# Patient Record
Sex: MALE | Race: WHITE | Employment: FULL TIME | URBAN - METROPOLITAN AREA
[De-identification: names, ages, dates, MRNs, and addresses within clinical notes are randomized per-mention and may not be internally consistent; named-entity substitution may affect disease eponyms.]

---

## 2020-06-29 ENCOUNTER — HOSPITAL ENCOUNTER (OUTPATIENT)
Age: 65
Setting detail: OBSERVATION
Discharge: HOME | End: 2020-06-30
Attending: EMERGENCY MEDICINE | Admitting: HOSPITALIST
Payer: COMMERCIAL

## 2020-06-29 ENCOUNTER — APPOINTMENT (OUTPATIENT)
Dept: CT IMAGING | Age: 65
End: 2020-06-29
Attending: EMERGENCY MEDICINE
Payer: COMMERCIAL

## 2020-06-29 DIAGNOSIS — R10.84 ABDOMINAL PAIN, GENERALIZED: ICD-10-CM

## 2020-06-29 DIAGNOSIS — R11.2 NAUSEA AND VOMITING, INTRACTABILITY OF VOMITING NOT SPECIFIED, UNSPECIFIED VOMITING TYPE: ICD-10-CM

## 2020-06-29 DIAGNOSIS — E87.20 LACTIC ACIDOSIS: ICD-10-CM

## 2020-06-29 DIAGNOSIS — I95.89 OTHER SPECIFIED HYPOTENSION: ICD-10-CM

## 2020-06-29 DIAGNOSIS — R00.1 JUNCTIONAL BRADYCARDIA: Primary | ICD-10-CM

## 2020-06-29 PROBLEM — N17.9 AKI (ACUTE KIDNEY INJURY) (HCC): Status: ACTIVE | Noted: 2020-06-29

## 2020-06-29 PROBLEM — I95.9 HYPOTENSION: Status: ACTIVE | Noted: 2020-06-29

## 2020-06-29 LAB
ALBUMIN SERPL-MCNC: 3.4 G/DL (ref 3.5–5)
ALBUMIN SERPL-MCNC: 3.7 G/DL (ref 3.5–5)
ALBUMIN/GLOB SERPL: 1 {RATIO} (ref 1.1–2.2)
ALBUMIN/GLOB SERPL: 1 {RATIO} (ref 1.1–2.2)
ALP SERPL-CCNC: 101 U/L (ref 45–117)
ALP SERPL-CCNC: 98 U/L (ref 45–117)
ALT SERPL-CCNC: 103 U/L (ref 12–78)
ALT SERPL-CCNC: 124 U/L (ref 12–78)
ANION GAP BLD CALC-SCNC: 15 MMOL/L (ref 10–20)
ANION GAP SERPL CALC-SCNC: 10 MMOL/L (ref 5–15)
ANION GAP SERPL CALC-SCNC: 7 MMOL/L (ref 5–15)
APPEARANCE UR: CLEAR
AST SERPL-CCNC: 81 U/L (ref 15–37)
AST SERPL-CCNC: 88 U/L (ref 15–37)
ATRIAL RATE: 43 BPM
ATRIAL RATE: 64 BPM
BACTERIA URNS QL MICRO: NEGATIVE /HPF
BASOPHILS # BLD: 0 K/UL (ref 0–0.1)
BASOPHILS NFR BLD: 0 % (ref 0–1)
BILIRUB SERPL-MCNC: 0.5 MG/DL (ref 0.2–1)
BILIRUB SERPL-MCNC: 0.7 MG/DL (ref 0.2–1)
BILIRUB UR QL: NEGATIVE
BUN BLD-MCNC: 19 MG/DL (ref 9–20)
BUN SERPL-MCNC: 12 MG/DL (ref 6–20)
BUN SERPL-MCNC: 19 MG/DL (ref 6–20)
BUN/CREAT SERPL: 14 (ref 12–20)
BUN/CREAT SERPL: 15 (ref 12–20)
CA-I BLD-MCNC: 1.16 MMOL/L (ref 1.12–1.32)
CALCIUM SERPL-MCNC: 8.4 MG/DL (ref 8.5–10.1)
CALCIUM SERPL-MCNC: 8.9 MG/DL (ref 8.5–10.1)
CALCULATED P AXIS, ECG09: 58 DEGREES
CALCULATED R AXIS, ECG10: -31 DEGREES
CALCULATED R AXIS, ECG10: -34 DEGREES
CALCULATED T AXIS, ECG11: -15 DEGREES
CALCULATED T AXIS, ECG11: -32 DEGREES
CHLORIDE BLD-SCNC: 107 MMOL/L (ref 98–107)
CHLORIDE SERPL-SCNC: 106 MMOL/L (ref 97–108)
CHLORIDE SERPL-SCNC: 110 MMOL/L (ref 97–108)
CK MB CFR SERPL CALC: 1.3 % (ref 0–2.5)
CK MB SERPL-MCNC: 2.5 NG/ML (ref 5–25)
CK SERPL-CCNC: 186 U/L (ref 39–308)
CK SERPL-CCNC: 232 U/L (ref 39–308)
CO2 BLD-SCNC: 23 MMOL/L (ref 21–32)
CO2 SERPL-SCNC: 21 MMOL/L (ref 21–32)
CO2 SERPL-SCNC: 24 MMOL/L (ref 21–32)
COLOR UR: NORMAL
COMMENT, HOLDF: NORMAL
CREAT BLD-MCNC: 1 MG/DL (ref 0.6–1.3)
CREAT SERPL-MCNC: 0.88 MG/DL (ref 0.7–1.3)
CREAT SERPL-MCNC: 1.31 MG/DL (ref 0.7–1.3)
DIAGNOSIS, 93000: NORMAL
DIAGNOSIS, 93000: NORMAL
DIFFERENTIAL METHOD BLD: ABNORMAL
EOSINOPHIL # BLD: 0.1 K/UL (ref 0–0.4)
EOSINOPHIL NFR BLD: 1 % (ref 0–7)
EPITH CASTS URNS QL MICRO: NORMAL /LPF
ERYTHROCYTE [DISTWIDTH] IN BLOOD BY AUTOMATED COUNT: 13.7 % (ref 11.5–14.5)
GLOBULIN SER CALC-MCNC: 3.5 G/DL (ref 2–4)
GLOBULIN SER CALC-MCNC: 3.6 G/DL (ref 2–4)
GLUCOSE BLD STRIP.AUTO-MCNC: 129 MG/DL (ref 65–100)
GLUCOSE BLD-MCNC: 133 MG/DL (ref 65–100)
GLUCOSE SERPL-MCNC: 126 MG/DL (ref 65–100)
GLUCOSE SERPL-MCNC: 91 MG/DL (ref 65–100)
GLUCOSE UR STRIP.AUTO-MCNC: NEGATIVE MG/DL
HCT VFR BLD AUTO: 43 % (ref 36.6–50.3)
HCT VFR BLD CALC: 43 % (ref 36.6–50.3)
HGB BLD-MCNC: 14.6 G/DL (ref 12.1–17)
HGB UR QL STRIP: NEGATIVE
HYALINE CASTS URNS QL MICRO: NORMAL /LPF (ref 0–5)
IMM GRANULOCYTES # BLD AUTO: 0.1 K/UL (ref 0–0.04)
IMM GRANULOCYTES NFR BLD AUTO: 1 % (ref 0–0.5)
INR PPP: 1 (ref 0.9–1.1)
KETONES UR QL STRIP.AUTO: NEGATIVE MG/DL
LACTATE BLD-SCNC: 2.4 MMOL/L (ref 0.4–2)
LACTATE BLD-SCNC: 3.2 MMOL/L (ref 0.4–2)
LACTATE SERPL-SCNC: 1.6 MMOL/L (ref 0.4–2)
LEUKOCYTE ESTERASE UR QL STRIP.AUTO: NEGATIVE
LIPASE SERPL-CCNC: 96 U/L (ref 73–393)
LYMPHOCYTES # BLD: 0.7 K/UL (ref 0.8–3.5)
LYMPHOCYTES NFR BLD: 7 % (ref 12–49)
MCH RBC QN AUTO: 29.9 PG (ref 26–34)
MCHC RBC AUTO-ENTMCNC: 34 G/DL (ref 30–36.5)
MCV RBC AUTO: 87.9 FL (ref 80–99)
MONOCYTES # BLD: 0.9 K/UL (ref 0–1)
MONOCYTES NFR BLD: 9 % (ref 5–13)
NEUTS SEG # BLD: 8.2 K/UL (ref 1.8–8)
NEUTS SEG NFR BLD: 82 % (ref 32–75)
NITRITE UR QL STRIP.AUTO: NEGATIVE
NRBC # BLD: 0 K/UL (ref 0–0.01)
NRBC BLD-RTO: 0 PER 100 WBC
P-R INTERVAL, ECG05: 146 MS
PH UR STRIP: 6 [PH] (ref 5–8)
PLATELET # BLD AUTO: 173 K/UL (ref 150–400)
PLATELET COMMENTS,PCOM: ABNORMAL
PMV BLD AUTO: 9.5 FL (ref 8.9–12.9)
POTASSIUM BLD-SCNC: 3.5 MMOL/L (ref 3.5–5.1)
POTASSIUM SERPL-SCNC: 3.7 MMOL/L (ref 3.5–5.1)
POTASSIUM SERPL-SCNC: 4 MMOL/L (ref 3.5–5.1)
PROT SERPL-MCNC: 6.9 G/DL (ref 6.4–8.2)
PROT SERPL-MCNC: 7.3 G/DL (ref 6.4–8.2)
PROT UR STRIP-MCNC: NEGATIVE MG/DL
PROTHROMBIN TIME: 10.9 SEC (ref 9–11.1)
Q-T INTERVAL, ECG07: 420 MS
Q-T INTERVAL, ECG07: 458 MS
QRS DURATION, ECG06: 106 MS
QRS DURATION, ECG06: 98 MS
QTC CALCULATION (BEZET), ECG08: 354 MS
QTC CALCULATION (BEZET), ECG08: 433 MS
RBC # BLD AUTO: 4.89 M/UL (ref 4.1–5.7)
RBC #/AREA URNS HPF: NORMAL /HPF (ref 0–5)
RBC MORPH BLD: ABNORMAL
SAMPLES BEING HELD,HOLD: NORMAL
SARS-COV-2, COV2: NOT DETECTED
SERVICE CMNT-IMP: ABNORMAL
SERVICE CMNT-IMP: ABNORMAL
SODIUM BLD-SCNC: 139 MMOL/L (ref 136–145)
SODIUM SERPL-SCNC: 137 MMOL/L (ref 136–145)
SODIUM SERPL-SCNC: 141 MMOL/L (ref 136–145)
SOURCE, COVRS: NORMAL
SP GR UR REFRACTOMETRY: 1.01 (ref 1–1.03)
SPECIMEN SOURCE, FCOV2M: NORMAL
TROPONIN I BLD-MCNC: <0.04 NG/ML (ref 0–0.08)
TROPONIN I SERPL-MCNC: <0.05 NG/ML
TSH SERPL DL<=0.05 MIU/L-ACNC: 2.44 UIU/ML (ref 0.36–3.74)
UA: UC IF INDICATED,UAUC: NORMAL
UROBILINOGEN UR QL STRIP.AUTO: 1 EU/DL (ref 0.2–1)
VENTRICULAR RATE, ECG03: 36 BPM
VENTRICULAR RATE, ECG03: 64 BPM
WBC # BLD AUTO: 10 K/UL (ref 4.1–11.1)
WBC URNS QL MICRO: NORMAL /HPF (ref 0–4)

## 2020-06-29 PROCEDURE — 74011250636 HC RX REV CODE- 250/636: Performed by: INTERNAL MEDICINE

## 2020-06-29 PROCEDURE — 74011250637 HC RX REV CODE- 250/637: Performed by: HOSPITALIST

## 2020-06-29 PROCEDURE — 96361 HYDRATE IV INFUSION ADD-ON: CPT

## 2020-06-29 PROCEDURE — 85025 COMPLETE CBC W/AUTO DIFF WBC: CPT

## 2020-06-29 PROCEDURE — 80047 BASIC METABLC PNL IONIZED CA: CPT

## 2020-06-29 PROCEDURE — 93005 ELECTROCARDIOGRAM TRACING: CPT

## 2020-06-29 PROCEDURE — 83690 ASSAY OF LIPASE: CPT

## 2020-06-29 PROCEDURE — 84484 ASSAY OF TROPONIN QUANT: CPT

## 2020-06-29 PROCEDURE — 84443 ASSAY THYROID STIM HORMONE: CPT

## 2020-06-29 PROCEDURE — 99285 EMERGENCY DEPT VISIT HI MDM: CPT

## 2020-06-29 PROCEDURE — 80053 COMPREHEN METABOLIC PANEL: CPT

## 2020-06-29 PROCEDURE — 81001 URINALYSIS AUTO W/SCOPE: CPT

## 2020-06-29 PROCEDURE — 74011250636 HC RX REV CODE- 250/636: Performed by: HOSPITALIST

## 2020-06-29 PROCEDURE — 87635 SARS-COV-2 COVID-19 AMP PRB: CPT

## 2020-06-29 PROCEDURE — 85610 PROTHROMBIN TIME: CPT

## 2020-06-29 PROCEDURE — 36415 COLL VENOUS BLD VENIPUNCTURE: CPT

## 2020-06-29 PROCEDURE — 99218 HC RM OBSERVATION: CPT

## 2020-06-29 PROCEDURE — 71275 CT ANGIOGRAPHY CHEST: CPT

## 2020-06-29 PROCEDURE — 96374 THER/PROPH/DIAG INJ IV PUSH: CPT

## 2020-06-29 PROCEDURE — 94762 N-INVAS EAR/PLS OXIMTRY CONT: CPT

## 2020-06-29 PROCEDURE — 94761 N-INVAS EAR/PLS OXIMETRY MLT: CPT

## 2020-06-29 PROCEDURE — 83605 ASSAY OF LACTIC ACID: CPT

## 2020-06-29 PROCEDURE — 74011636320 HC RX REV CODE- 636/320: Performed by: EMERGENCY MEDICINE

## 2020-06-29 PROCEDURE — 74011250636 HC RX REV CODE- 250/636

## 2020-06-29 PROCEDURE — 96372 THER/PROPH/DIAG INJ SC/IM: CPT

## 2020-06-29 PROCEDURE — 82962 GLUCOSE BLOOD TEST: CPT

## 2020-06-29 PROCEDURE — 82550 ASSAY OF CK (CPK): CPT

## 2020-06-29 PROCEDURE — 74174 CTA ABD&PLVS W/CONTRAST: CPT

## 2020-06-29 PROCEDURE — 82553 CREATINE MB FRACTION: CPT

## 2020-06-29 PROCEDURE — 74011250636 HC RX REV CODE- 250/636: Performed by: EMERGENCY MEDICINE

## 2020-06-29 RX ORDER — DM/PE/ACETAMINOPHEN/CHLORPHENR 10-5-325-2
1 TABLET, SEQUENTIAL ORAL DAILY
COMMUNITY

## 2020-06-29 RX ORDER — LOSARTAN POTASSIUM 50 MG/1
50 TABLET ORAL DAILY
Status: DISCONTINUED | OUTPATIENT
Start: 2020-06-29 | End: 2020-06-30

## 2020-06-29 RX ORDER — SODIUM CHLORIDE 0.9 % (FLUSH) 0.9 %
5-40 SYRINGE (ML) INJECTION AS NEEDED
Status: DISCONTINUED | OUTPATIENT
Start: 2020-06-29 | End: 2020-06-30 | Stop reason: HOSPADM

## 2020-06-29 RX ORDER — ACETAMINOPHEN 325 MG/1
650 TABLET ORAL
Status: DISCONTINUED | OUTPATIENT
Start: 2020-06-29 | End: 2020-06-30 | Stop reason: HOSPADM

## 2020-06-29 RX ORDER — ATROPINE SULFATE 0.1 MG/ML
INJECTION INTRAVENOUS
Status: COMPLETED
Start: 2020-06-29 | End: 2020-06-29

## 2020-06-29 RX ORDER — LOSARTAN POTASSIUM 50 MG/1
50 TABLET ORAL DAILY
COMMUNITY
Start: 2020-03-26

## 2020-06-29 RX ORDER — SODIUM CHLORIDE 0.9 % (FLUSH) 0.9 %
10 SYRINGE (ML) INJECTION
Status: COMPLETED | OUTPATIENT
Start: 2020-06-29 | End: 2020-06-29

## 2020-06-29 RX ORDER — ATROPINE SULFATE 0.1 MG/ML
0.5 INJECTION INTRAVENOUS
Status: COMPLETED | OUTPATIENT
Start: 2020-06-29 | End: 2020-06-29

## 2020-06-29 RX ORDER — SODIUM CHLORIDE 9 MG/ML
100 INJECTION, SOLUTION INTRAVENOUS CONTINUOUS
Status: DISCONTINUED | OUTPATIENT
Start: 2020-06-29 | End: 2020-06-30

## 2020-06-29 RX ORDER — ESCITALOPRAM OXALATE 10 MG/1
10 TABLET ORAL DAILY
COMMUNITY
Start: 2020-03-26

## 2020-06-29 RX ORDER — SODIUM CHLORIDE 0.9 % (FLUSH) 0.9 %
5-40 SYRINGE (ML) INJECTION EVERY 8 HOURS
Status: DISCONTINUED | OUTPATIENT
Start: 2020-06-29 | End: 2020-06-30 | Stop reason: HOSPADM

## 2020-06-29 RX ORDER — ONDANSETRON 2 MG/ML
4 INJECTION INTRAMUSCULAR; INTRAVENOUS
Status: DISCONTINUED | OUTPATIENT
Start: 2020-06-29 | End: 2020-06-30 | Stop reason: HOSPADM

## 2020-06-29 RX ORDER — ENOXAPARIN SODIUM 100 MG/ML
40 INJECTION SUBCUTANEOUS EVERY 24 HOURS
Status: DISCONTINUED | OUTPATIENT
Start: 2020-06-29 | End: 2020-06-30 | Stop reason: HOSPADM

## 2020-06-29 RX ORDER — DOCUSATE SODIUM 100 MG/1
100 CAPSULE, LIQUID FILLED ORAL 2 TIMES DAILY
Status: DISCONTINUED | OUTPATIENT
Start: 2020-06-29 | End: 2020-06-30 | Stop reason: HOSPADM

## 2020-06-29 RX ADMIN — SODIUM CHLORIDE 1000 ML: 900 INJECTION, SOLUTION INTRAVENOUS at 05:36

## 2020-06-29 RX ADMIN — SODIUM CHLORIDE 1000 ML: 900 INJECTION, SOLUTION INTRAVENOUS at 02:03

## 2020-06-29 RX ADMIN — ATROPINE SULFATE 0.5 MG: 0.1 INJECTION INTRAVENOUS at 02:03

## 2020-06-29 RX ADMIN — ATROPINE SULFATE 0.5 MG: 0.1 INJECTION PARENTERAL at 02:03

## 2020-06-29 RX ADMIN — Medication 10 ML: at 02:03

## 2020-06-29 RX ADMIN — LOSARTAN POTASSIUM 50 MG: 50 TABLET ORAL at 12:51

## 2020-06-29 RX ADMIN — DOCUSATE SODIUM 100 MG: 100 CAPSULE, LIQUID FILLED ORAL at 11:34

## 2020-06-29 RX ADMIN — SODIUM CHLORIDE 100 ML/HR: 900 INJECTION, SOLUTION INTRAVENOUS at 11:25

## 2020-06-29 RX ADMIN — IOPAMIDOL 100 ML: 755 INJECTION, SOLUTION INTRAVENOUS at 02:03

## 2020-06-29 RX ADMIN — DOCUSATE SODIUM 100 MG: 100 CAPSULE, LIQUID FILLED ORAL at 17:17

## 2020-06-29 RX ADMIN — Medication 10 ML: at 17:18

## 2020-06-29 RX ADMIN — Medication 10 ML: at 22:54

## 2020-06-29 RX ADMIN — ENOXAPARIN SODIUM 40 MG: 40 INJECTION SUBCUTANEOUS at 11:34

## 2020-06-29 NOTE — PROGRESS NOTES
Pharmacy Clarification of Prior to Admission Medication Regimen     The patient was was interviewed regarding clarification of the prior to admission medication regimen. Patient present in room and obtained permission from patient to discuss drug regimen with visitor(s) present. Patient was questioned regarding use of any other inhalers, topical products, over the counter medications, herbal medications, vitamin products or ophthalmic/nasal/otic medication use. Information Obtained From: patient, rx query    Pertinent Pharmacy Findings:  Updated patients preferred outpatient pharmacy to: [facility]  Patient stated he used CBD oil under his tongue evening of 6/28/20    PTA medication list was corrected to the following:     Prior to Admission Medications   Prescriptions Last Dose Informant Taking?   aspirin delayed-release 81 mg tablet 6/28/2020 at 0800 Self Yes   Sig: Take 81 mg by mouth daily. escitalopram oxalate (LEXAPRO) 10 mg tablet 6/28/2020 at 0800 Self Yes   Sig: Take 10 mg by mouth daily. Take 1/2 tablet by mouth every day   losartan (COZAAR) 50 mg tablet 6/28/2020 at 0800 Self Yes   Sig: Take 50 mg by mouth daily.  Take 1 tablet by mouth every day      Facility-Administered Medications: None          Thank you,  [place] CPhT  Medication History

## 2020-06-29 NOTE — ED NOTES
TRANSFER - OUT REPORT:    Verbal report given to Al,RN(name) on Sam Delcid  being transferred to PCU ([place] for COVID r/o, Telemetry)(unit) for routine progression of care      Report consisted of patients Situation, Background, Assessment and   Recommendations(SBAR). Information from the following report(s) SBAR, Kardex, ED Summary, Intake/Output, MAR, Recent Results and Cardiac Rhythm SB was reviewed with the receiving nurse. Pt going to PCU as COVID r/o, Telemetry status. Discussed with charge RN. Lines:   Peripheral IV 06/29/20 Left Antecubital (Active)       Peripheral IV 06/29/20 Right Hand (Active)   Site Assessment Clean, dry, & intact 6/29/2020  2:00 AM   Phlebitis Assessment 0 6/29/2020  2:00 AM   Infiltration Assessment 0 6/29/2020  2:00 AM   Dressing Status Clean, dry, & intact 6/29/2020  2:00 AM   Dressing Type Transparent 6/29/2020  2:00 AM   Hub Color/Line Status Patent; Flushed;Pink 6/29/2020  2:00 AM        Opportunity for questions and clarification was provided.       Patient transported with:   Transport

## 2020-06-29 NOTE — H&P
Hospitalist Admission Note    NAME: Efren Hodgson   :  1955   MRN:  [id number]     Date/Time:  2020 9:56 AM    Patient PCP: Lisy Mazariegos MD   GI:  Dr. Dar Perera  ______________________________________________________________________   Assessment & Plan:  Junctional bradycardia HR 34-36 with hypotension in setting of severe abdominal pain, POA    Lactic acidosis 3.2-->2.4 with 2L IVF  --bradycardia improved with atropine and hypotension resolved with atropine and IVF  --per patient, baseline HR in 46s; stopped taking bystolic due to severe bradycardia HR in 40s  --observation status  --CTA chest/abdomen without aneurysm or mesenteric ischemia/stenosis  --cardiology consult. --check echo, tsh, repeat cardiac enzymes and lactic. Check lipase  --hold losartan as BP in low 100-110s currently  --check for covid-19 infection with rapid SARS-COV-2 test  --IVF NS 150ml/hr  --no clear infection, ?vasovagal response    Fatty liver  Chronic elevated LFTs per patient  --monitor LFTs. No further abdominal pain so hold off abd US at this time  --check INR    Mild DIEGO Cr 1.3, baseline unknown  --IVF and recheck, hold losartan for now. UA normal    Body mass index is 28.59 kg/m². Code: full  DVT prophylaxis: lovenox  Surrogate decision maker:  Wife Ninoska Giang [phone]        Subjective:   CHIEF COMPLAINT:  Abdominal pain    HISTORY OF PRESENT ILLNESS:     Efren Hodgson is a 63 y.o. male with PMH fatty liver, HTN, hyperlipidemia not on statin, hx elevated LFTs presented with acute onset of severe 10/10 diffuse abdominal pain, associated with nausea, and then had 3 episodes of vomiting yellow fluid in ER. [place] like his abdomen was going to explode. + sweats and dizziness. Last BM 3 days ago. Denied fever, chills, SOB. No chest pain. Had spent 5 hours cutting yard earlier yesterday and though he may be dehydrated and back was also achy.     In ER, was in junctional bradycardia HR 34, BP initially 80/62. Given atropine 0.5mg and 2L IVF. We were asked to admit for work up and evaluation of the above problems. Past Medical History:   Diagnosis Date    Fatty liver     Hyperlipidemia     Hypertension     Other ill-defined conditions(799.89)     MRSA L posterior thigh - 2006      Past Surgical History:   Procedure Laterality Date    HX HERNIA REPAIR Left 2012    HX TONSILLECTOMY      TN COLONOSCOPY FLX DX W/COLLJ SPEC WHEN PFRMD  11/21/2012    Dr. Crystal Langford History     Tobacco Use    Smoking status: Current Every Day Smoker     Types: Cigars    Smokeless tobacco: Never Used   Substance Use Topics    Alcohol use: Not Currently      Drug use:  denies  , work as     Family History   Problem Relation Age of Onset    Diabetes Mother     Alzheimer Father     Other Brother         fatty liver     Allergies   Allergen Reactions    Lisinopril Cough    Pravastatin Other (comments)     Right shoulder pain        Prior to Admission medications    Medication Sig Start Date End Date Taking? Authorizing Provider   escitalopram oxalate (LEXAPRO) 10 mg tablet Take 10 mg by mouth daily. Take 1/2 tablet by mouth every day 3/26/20  Yes Other, MD Marce   losartan (COZAAR) 50 mg tablet Take 50 mg by mouth daily. Take 1 tablet by mouth every day 3/26/20  Yes Other, MD Marce   glucosamine (Glucosamine Relief) 1,000 mg tab Take 1 Tab by mouth daily. Yes Provider, Historical   aspirin delayed-release 81 mg tablet Take 81 mg by mouth daily. Yes Provider, Historical     REVIEW OF SYSTEMS:  POSITIVE= Bold.   Negative = normal text  General:  fever, chills, sweats, generalized weakness, weight loss/gain, loss of appetite  Eyes:  blurred vision, eye pain, loss of vision, diplopia  Ear Nose and Throat:  rhinorrhea, pharyngitis  Respiratory:   cough, sputum production, SOB, wheezing, BASILIO, pleuritic pain  Cardiology:  chest pain, palpitations, orthopnea, PND, edema, syncope   Gastrointestinal:  abdominal pain, N/V, dysphagia, diarrhea, constipation, bleeding  Genitourinary:  frequency, urgency, dysuria, hematuria, incontinence  Muskuloskeletal :  arthralgia, myalgia  Hematology:  easy bruising, bleeding, lymphadenopathy  Dermatological:  rash, ulceration, pruritis  Endocrine:  hot flashes or polydipsia  Neurological:  headache, dizziness, confusion, focal weakness, paresthesia, memory loss, gait disturbance  Psychological: anxiety, depression, agitation      Objective:   VITALS:    Visit Vitals  /76   Pulse (!) 55   Temp 97.5 °F (36.4 °C)   Resp 20   Ht 5' 11\" (1.803 m)   Wt 93 kg (205 lb)   SpO2 98%   BMI 28.59 kg/m²     Temp (24hrs), Av.5 °F (36.4 °C), Min:97.5 °F (36.4 °C), Max:97.5 °F (36.4 °C)    Body mass index is 28.59 kg/m². PHYSICAL EXAM:    General:    Alert, cooperative, no distress, appears stated age. HEENT: Atraumatic, anicteric sclerae, pink conjunctivae     No oral ulcers, mucosa moist, throat clear. Hearing intact. Neck:  Supple, symmetrical,  thyroid: non tender  Lungs:   Clear to auscultation bilaterally. No Wheezing or Rhonchi. No rales. Chest wall:  No tenderness  No Accessory muscle use. Heart:   Regular  rhythm,  Bradycardia, HR mid 50s on tele. No  murmur   No gallop. No edema. Abdomen:   Soft, non-tender. Not distended. Bowel sounds normal. No masses  Extremities: No cyanosis. No clubbing  Skin:     Not pale Not Jaundiced  Several small abrasions on lower legs b/l  Psych:  Good insight. Not depressed. Not anxious or agitated. Neurologic: EOMs intact. No facial asymmetry. No aphasia or slurred speech. Symmetrical strength, Alert and oriented X 3.    Peripheral pulse: Right, Radial, 2+  Capillary refill:  normal    IMAGING RESULTS:   []       I have personally reviewed the actual   []     CXR  []     CT scan  CXR:  CT :  EKG:    ________________________________________________________________________  Care Plan discussed with:    Comments   Patient y    [facility]:      ________________________________________________________________________  Prophylaxis:  GI none   DVT lovenox   ________________________________________________________________________  Recommended Disposition:   Home with Family y   HH/PT/OT/RN    SNF/LTC    [facility]    ________________________________________________________________________  Code Status:  Full Code y   DNR/DNI    ________________________________________________________________________  TOTAL TIME: 55 minutes    ______________________________________________________________________  Ben Mullen MD      Procedures: see electronic medical records for all procedures/Xrays and details which were not copied into this note but were reviewed prior to creation of Plan.     LAB DATA REVIEWED:    Recent Results (from the past 24 hour(s))   EKG, 12 LEAD, INITIAL    Collection Time: 06/29/20  1:46 AM   Result Value Ref Range    Ventricular Rate 36 BPM    Atrial Rate 43 BPM    QRS Duration 106 ms    Q-T Interval 458 ms    QTC Calculation (Bezet) 354 ms    Calculated R Axis -34 degrees    Calculated T Axis -15 degrees    Diagnosis       Junctional bradycardia  Left axis deviation  Incomplete left bundle branch block  Minimal voltage criteria for LVH, may be normal variant  No previous ECGs available     GLUCOSE, POC    Collection Time: 06/29/20  1:59 AM   Result Value Ref Range    Glucose (POC) 129 (H) 65 - 100 mg/dL    Performed by Puja ZELAYA    METABOLIC PANEL, COMPREHENSIVE    Collection Time: 06/29/20  2:01 AM   Result Value Ref Range    Sodium 137 136 - 145 mmol/L    Potassium 3.7 3.5 - 5.1 mmol/L    Chloride 106 97 - 108 mmol/L    CO2 21 21 - 32 mmol/L    Anion gap 10 5 - 15 mmol/L    Glucose 126 (H) 65 - 100 mg/dL    BUN 19 6 - 20 MG/DL    Creatinine 1.31 (H) 0.70 - 1.30 MG/DL    BUN/Creatinine ratio 15 12 - 20      GFR est AA >60 >60 ml/min/1.73m2 GFR est non-AA 55 (L) >60 ml/min/1.73m2    Calcium 8.9 8.5 - 10.1 MG/DL    Bilirubin, total 0.5 0.2 - 1.0 MG/DL    ALT (SGPT) 103 (H) 12 - 78 U/L    AST (SGOT) 88 (H) 15 - 37 U/L    Alk. phosphatase 101 45 - 117 U/L    Protein, total 7.3 6.4 - 8.2 g/dL    Albumin 3.7 3.5 - 5.0 g/dL    Globulin 3.6 2.0 - 4.0 g/dL    A-G Ratio 1.0 (L) 1.1 - 2.2     CK W/ REFLX CKMB    Collection Time: 06/29/20  2:01 AM   Result Value Ref Range     39 - 308 U/L   TROPONIN I    Collection Time: 06/29/20  2:01 AM   Result Value Ref Range    Troponin-I, Qt. <0.05 <0.05 ng/mL   SAMPLES BEING HELD    Collection Time: 06/29/20  2:02 AM   Result Value Ref Range    SAMPLES BEING HELD BL     COMMENT        Add-on orders for these samples will be processed based on acceptable specimen integrity and analyte stability, which may vary by analyte. POC CHEM8    Collection Time: 06/29/20  2:02 AM   Result Value Ref Range    Calcium, ionized (POC) 1.16 1.12 - 1.32 mmol/L    Sodium (POC) 139 136 - 145 mmol/L    Potassium (POC) 3.5 3.5 - 5.1 mmol/L    Chloride (POC) 107 98 - 107 mmol/L    CO2 (POC) 23 21 - 32 mmol/L    Anion gap (POC) 15 10 - 20 mmol/L    Glucose (POC) 133 (H) 65 - 100 mg/dL    BUN (POC) 19 9 - 20 mg/dL    Creatinine (POC) 1.0 0.6 - 1.3 mg/dL    GFRAA, POC >60 >60 ml/min/1.73m2    GFRNA, POC >60 >60 ml/min/1.73m2    Hematocrit (POC) 43 36.6 - 50.3 %    Comment Comment Not Indicated.      POC TROPONIN-I    Collection Time: 06/29/20  2:03 AM   Result Value Ref Range    Troponin-I (POC) <0.04 0.00 - 0.08 ng/mL   POC LACTIC ACID    Collection Time: 06/29/20  2:07 AM   Result Value Ref Range    Lactic Acid (POC) 3.20 (HH) 0.40 - 2.00 mmol/L   EKG, 12 LEAD, SUBSEQUENT    Collection Time: 06/29/20  2:54 AM   Result Value Ref Range    Ventricular Rate 64 BPM    Atrial Rate 64 BPM    P-R Interval 146 ms    QRS Duration 98 ms    Q-T Interval 420 ms    QTC Calculation (Bezet) 433 ms    Calculated P Axis 58 degrees    Calculated R Axis -31 degrees    Calculated T Axis -32 degrees    Diagnosis       Normal sinus rhythm  Left axis deviation  Minimal voltage criteria for LVH, may be normal variant  When compared with ECG of 29-JUN-2020 01:46,  MANUAL COMPARISON REQUIRED, DATA IS UNCONFIRMED     URINALYSIS W/ REFLEX CULTURE    Collection Time: 06/29/20  3:21 AM   Result Value Ref Range    Color YELLOW/STRAW      Appearance CLEAR CLEAR      Specific gravity 1.015 1.003 - 1.030      pH (UA) 6.0 5.0 - 8.0      Protein Negative NEG mg/dL    Glucose Negative NEG mg/dL    Ketone Negative NEG mg/dL    Bilirubin Negative NEG      Blood Negative NEG      Urobilinogen 1.0 0.2 - 1.0 EU/dL    Nitrites Negative NEG      Leukocyte Esterase Negative NEG      UA:UC IF INDICATED CULTURE NOT INDICATED BY UA RESULT CNI      WBC 0-4 0 - 4 /hpf    RBC 0-5 0 - 5 /hpf    Epithelial cells FEW FEW /lpf    Bacteria Negative NEG /hpf    Hyaline cast 0-2 0 - 5 /lpf   CBC WITH AUTOMATED DIFF    Collection Time: 06/29/20  3:52 AM   Result Value Ref Range    WBC 10.0 4.1 - 11.1 K/uL    RBC 4.89 4. 10 - 5.70 M/uL    HGB 14.6 12.1 - 17.0 g/dL    HCT 43.0 36.6 - 50.3 %    MCV 87.9 80.0 - 99.0 FL    MCH 29.9 26.0 - 34.0 PG    MCHC 34.0 30.0 - 36.5 g/dL    RDW 13.7 11.5 - 14.5 %    PLATELET 138 120 - 580 K/uL    MPV 9.5 8.9 - 12.9 FL    NRBC 0.0 0  WBC    ABSOLUTE NRBC 0.00 0.00 - 0.01 K/uL    NEUTROPHILS 82 (H) 32 - 75 %    LYMPHOCYTES 7 (L) 12 - 49 %    MONOCYTES 9 5 - 13 %    EOSINOPHILS 1 0 - 7 %    BASOPHILS 0 0 - 1 %    IMMATURE GRANULOCYTES 1 (H) 0.0 - 0.5 %    ABS. NEUTROPHILS 8.2 (H) 1.8 - 8.0 K/UL    ABS. LYMPHOCYTES 0.7 (L) 0.8 - 3.5 K/UL    ABS. MONOCYTES 0.9 0.0 - 1.0 K/UL    ABS. EOSINOPHILS 0.1 0.0 - 0.4 K/UL    ABS. BASOPHILS 0.0 0.0 - 0.1 K/UL    ABS. IMM.  GRANS. 0.1 (H) 0.00 - 0.04 K/UL    DF SMEAR SCANNED      PLATELET COMMENTS Large Platelets      RBC COMMENTS NORMOCYTIC, NORMOCHROMIC     POC LACTIC ACID    Collection Time: 06/29/20  5:12 AM   Result Value Ref Range    Lactic Acid (POC) 2.40 (HH) 0.40 - 2.00 mmol/L   TROPONIN I    Collection Time: 06/29/20  5:38 AM   Result Value Ref Range    Troponin-I, Qt. <0.05 <0.05 ng/mL

## 2020-06-29 NOTE — ED NOTES
Pt care assumed. Pt arrived to the ED AAOX4, with a c/c of abd pain, being admitted for Bradycardia and Hypotension. Pt is now in ED room with side rail up, bed to lowest position and call light within reach. Pt verbalizes no other complaint at this time,VS noted stable. Will continue to monitor.

## 2020-06-29 NOTE — ED PROVIDER NOTES
EMERGENCY DEPARTMENT HISTORY AND PHYSICAL EXAM      Date: 6/29/2020  Patient Name: Elliot Johnson    History of Presenting Illness     Chief Complaint   Patient presents with    Abdominal Pain       History Provided By: Patient    HPI: Elliot Johnson, 63 y.o. male with PMHx significant for hypertension presents to the ED with chief complaint of severe abdominal pain that started about 10 PM.  Patient reports he \"feels like his abdomen is going to explode\". Patient denies any vomiting but does report some nausea tonight. He denies any black or bloody stools, constipation, diarrhea, dysuria, hematuria, urinary frequency. Gerldine Prost He denies any chest pain, shortness of breath, cough, or fever. Patient states his abdomen has become more painful through the evening. He wonders if he may have some heat exhaustion from working out in the heat yesterday. He denies history of coronary artery disease or irregular heart rhythm. PCP: Rehana Benson MD    No current facility-administered medications on file prior to encounter. Current Outpatient Medications on File Prior to Encounter   Medication Sig Dispense Refill    escitalopram oxalate (LEXAPRO) 10 mg tablet Take 10 mg by mouth daily. Take 1/2 tablet by mouth every day      losartan (COZAAR) 50 mg tablet Take 50 mg by mouth daily. Take 1 tablet by mouth every day      omega-3 fatty acids-vitamin e (FISH OIL) 1,000 mg cap Take 3 Caps by mouth daily.  aspirin delayed-release 81 mg tablet Take 81 mg by mouth daily. Past History     Past Medical History:  Past Medical History:   Diagnosis Date    Hypertension     Other ill-defined conditions(799.89)     MRSA L posterior thigh - 2006       Past Surgical History:  Past Surgical History:   Procedure Laterality Date    HX TONSILLECTOMY      [place] COLONOSCOPY FLX DX W/COLLJ SPEC WHEN PFRMD  11/21/2012            Family History:  History reviewed. No pertinent family history.     Social History:  Social History     Tobacco Use    Smoking status: Current Every Day Smoker    Smokeless tobacco: Never Used   Substance Use Topics    Alcohol use: Not Currently    Drug use: Never       Allergies:  No Known Allergies      Review of Systems   Review of Systems   Constitutional: Negative for chills and fever. HENT: Negative for congestion, ear pain and sore throat. Eyes: Negative. Respiratory: Negative for cough, chest tightness, shortness of breath and wheezing. Cardiovascular: Negative for chest pain and palpitations. Gastrointestinal: Positive for abdominal pain and nausea. Negative for diarrhea. All other systems reviewed and are negative.         Physical Exam   General appearance -morbidly obese, ill-appearing, anxious and diaphoretic  Eyes - pupils equal and reactive, extraocular eye movements intact  ENT - mucous membranes moist, pharynx normal without lesions  Neck - supple, no significant adenopathy; non-tender to palpation  Chest - clear to auscultation, no wheezes, rales or rhonchi; non-tender to palpation  Heart -extremely bradycardic (to low 30s) , no murmurs noted  Abdomen - soft, generalized abdominal tenderness, no rebound or guarding, nondistended, no masses or organomegaly  Musculoskeletal - no joint tenderness, deformity or swelling; normal ROM  Extremities - peripheral pulses normal, no pedal edema  Skin -pale, diaphoretic, clammy, no rashes  Neurological -anxious, alert, oriented x3, normal speech, no focal findings or movement disorder noted    Diagnostic Study Results     Labs -     Recent Results (from the past 12 hour(s))   EKG, 12 LEAD, INITIAL    Collection Time: 06/29/20  1:46 AM   Result Value Ref Range    Ventricular Rate 36 BPM    Atrial Rate 43 BPM    QRS Duration 106 ms    Q-T Interval 458 ms    QTC Calculation (Bezet) 354 ms    Calculated R Axis -34 degrees    Calculated T Axis -15 degrees    Diagnosis       Junctional bradycardia  Left axis deviation  Incomplete left bundle branch block  Minimal voltage criteria for LVH, may be normal variant  No previous ECGs available     GLUCOSE, POC    Collection Time: 06/29/20  1:59 AM   Result Value Ref Range    Glucose (POC) 129 (H) 65 - 100 mg/dL    Performed by Keli ZELAYA    METABOLIC PANEL, COMPREHENSIVE    Collection Time: 06/29/20  2:01 AM   Result Value Ref Range    Sodium 137 136 - 145 mmol/L    Potassium 3.7 3.5 - 5.1 mmol/L    Chloride 106 97 - 108 mmol/L    CO2 21 21 - 32 mmol/L    Anion gap 10 5 - 15 mmol/L    Glucose 126 (H) 65 - 100 mg/dL    BUN 19 6 - 20 MG/DL    Creatinine 1.31 (H) 0.70 - 1.30 MG/DL    BUN/Creatinine ratio 15 12 - 20      GFR est AA >60 >60 ml/min/1.73m2    GFR est non-AA 55 (L) >60 ml/min/1.73m2    Calcium 8.9 8.5 - 10.1 MG/DL    Bilirubin, total 0.5 0.2 - 1.0 MG/DL    ALT (SGPT) 103 (H) 12 - 78 U/L    AST (SGOT) 88 (H) 15 - 37 U/L    Alk. phosphatase 101 45 - 117 U/L    Protein, total 7.3 6.4 - 8.2 g/dL    Albumin 3.7 3.5 - 5.0 g/dL    Globulin 3.6 2.0 - 4.0 g/dL    A-G Ratio 1.0 (L) 1.1 - 2.2     CK W/ REFLX CKMB    Collection Time: 06/29/20  2:01 AM   Result Value Ref Range     39 - 308 U/L   TROPONIN I    Collection Time: 06/29/20  2:01 AM   Result Value Ref Range    Troponin-I, Qt. <0.05 <0.05 ng/mL   SAMPLES BEING HELD    Collection Time: 06/29/20  2:02 AM   Result Value Ref Range    SAMPLES BEING HELD BL     COMMENT        Add-on orders for these samples will be processed based on acceptable specimen integrity and analyte stability, which may vary by analyte.    POC CHEM8    Collection Time: 06/29/20  2:02 AM   Result Value Ref Range    Calcium, ionized (POC) 1.16 1.12 - 1.32 mmol/L    Sodium (POC) 139 136 - 145 mmol/L    Potassium (POC) 3.5 3.5 - 5.1 mmol/L    Chloride (POC) 107 98 - 107 mmol/L    CO2 (POC) 23 21 - 32 mmol/L    Anion gap (POC) 15 10 - 20 mmol/L    Glucose (POC) 133 (H) 65 - 100 mg/dL    BUN (POC) 19 9 - 20 mg/dL    Creatinine (POC) 1.0 0.6 - 1.3 mg/dL    GFRAA, POC >60 >60 ml/min/1.73m2    GFRNA, POC >60 >60 ml/min/1.73m2    Hematocrit (POC) 43 36.6 - 50.3 %    Comment Comment Not Indicated. POC TROPONIN-I    Collection Time: 06/29/20  2:03 AM   Result Value Ref Range    Troponin-I (POC) <0.04 0.00 - 0.08 ng/mL   POC LACTIC ACID    Collection Time: 06/29/20  2:07 AM   Result Value Ref Range    Lactic Acid (POC) 3.20 (HH) 0.40 - 2.00 mmol/L   EKG, 12 LEAD, SUBSEQUENT    Collection Time: 06/29/20  2:54 AM   Result Value Ref Range    Ventricular Rate 64 BPM    Atrial Rate 64 BPM    P-R Interval 146 ms    QRS Duration 98 ms    Q-T Interval 420 ms    QTC Calculation (Bezet) 433 ms    Calculated P Axis 58 degrees    Calculated R Axis -31 degrees    Calculated T Axis -32 degrees    Diagnosis       Normal sinus rhythm  Left axis deviation  Minimal voltage criteria for LVH, may be normal variant  When compared with ECG of 29-JUN-2020 01:46,  MANUAL COMPARISON REQUIRED, DATA IS UNCONFIRMED     URINALYSIS W/ REFLEX CULTURE    Collection Time: 06/29/20  3:21 AM   Result Value Ref Range    Color YELLOW/STRAW      Appearance CLEAR CLEAR      Specific gravity 1.015 1.003 - 1.030      pH (UA) 6.0 5.0 - 8.0      Protein Negative NEG mg/dL    Glucose Negative NEG mg/dL    Ketone Negative NEG mg/dL    Bilirubin Negative NEG      Blood Negative NEG      Urobilinogen 1.0 0.2 - 1.0 EU/dL    Nitrites Negative NEG      Leukocyte Esterase Negative NEG      UA:UC IF INDICATED CULTURE NOT INDICATED BY UA RESULT CNI      WBC 0-4 0 - 4 /hpf    RBC 0-5 0 - 5 /hpf    Epithelial cells FEW FEW /lpf    Bacteria Negative NEG /hpf    Hyaline cast 0-2 0 - 5 /lpf   CBC WITH AUTOMATED DIFF    Collection Time: 06/29/20  3:52 AM   Result Value Ref Range    WBC 10.0 4.1 - 11.1 K/uL    RBC 4.89 4. 10 - 5.70 M/uL    HGB 14.6 12.1 - 17.0 g/dL    HCT 43.0 36.6 - 50.3 %    MCV 87.9 80.0 - 99.0 FL    MCH 29.9 26.0 - 34.0 PG    MCHC 34.0 30.0 - 36.5 g/dL    RDW 13.7 11.5 - 14.5 %    PLATELET 336 088 - 400 K/uL    MPV 9.5 8.9 - 12.9 FL    NRBC 0.0 0  WBC    ABSOLUTE NRBC 0.00 0.00 - 0.01 K/uL    NEUTROPHILS 82 (H) 32 - 75 %    LYMPHOCYTES 7 (L) 12 - 49 %    MONOCYTES 9 5 - 13 %    EOSINOPHILS 1 0 - 7 %    BASOPHILS 0 0 - 1 %    IMMATURE GRANULOCYTES 1 (H) 0.0 - 0.5 %    ABS. NEUTROPHILS 8.2 (H) 1.8 - 8.0 K/UL    ABS. LYMPHOCYTES 0.7 (L) 0.8 - 3.5 K/UL    ABS. MONOCYTES 0.9 0.0 - 1.0 K/UL    ABS. EOSINOPHILS 0.1 0.0 - 0.4 K/UL    ABS. BASOPHILS 0.0 0.0 - 0.1 K/UL    ABS. IMM. GRANS. 0.1 (H) 0.00 - 0.04 K/UL    DF SMEAR SCANNED      PLATELET COMMENTS Large Platelets      RBC COMMENTS NORMOCYTIC, NORMOCHROMIC     POC LACTIC ACID    Collection Time: 06/29/20  5:12 AM   Result Value Ref Range    Lactic Acid (POC) 2.40 (HH) 0.40 - 2.00 mmol/L       Radiologic Studies -   CTA CHEST W OR W WO CONT   Final Result   IMPRESSION:       1. Normal appearance to the thoracic, abdominal, and pelvic vasculature with no   aneurysm or dissection. 2. Incidental findings as above including nonobstructing left nephrolithiasis. CTA ABDOMEN PELV W CONT   Final Result   IMPRESSION:       1. Normal appearance to the thoracic, abdominal, and pelvic vasculature with no   aneurysm or dissection. 2. Incidental findings as above including nonobstructing left nephrolithiasis. CT Results  (Last 48 hours)               06/29/20 0226  CTA CHEST W OR W WO CONT Final result    Impression:  IMPRESSION:        1. Normal appearance to the thoracic, abdominal, and pelvic vasculature with no   aneurysm or dissection. 2. Incidental findings as above including nonobstructing left nephrolithiasis. Narrative:  EXAM: CTA CHEST, ABDOMEN, AND PELVIS       CLINICAL HISTORY: hypotensive abd pain, eval aorta. COMPARISON: None. TECHNIQUE: CTA of the chest, abdomen, and pelvis performed with helical 2.5 mm   axial reconstructions. Sagittal and coronal 2.5 mm reformatted imaging was   performed.  Thin section and thick section MIP sagittal and coronal   reconstructions and manual post-processing of the images with 3-D volume   rotating MIP projections of the vascular tree generated. Standard dose   modulation was utilized to reduce the overall radiation dose administered to the   patient. CONTRAST:  100 mL Isovue-370 IV. FINDINGS:        The aortic arch shows normal branch pattern with normally enhancing visualized   portions of the innominate, right subclavian, right common carotid, left common   carotid, left subclavian, and vertebral arteries. The descending thoracic aorta   opacifies normally with no dissection or aneurysm. The heart is normal in size without pericardial effusion. Pulmonary arteries   normal in caliber with no identified pulmonary embolus. Within the abdomen the aorta is normal in caliber without aneurysm or   dissection. There is normal opacification of the celiac axis, superior   mesenteric artery, renal arteries, and inferior mesenteric artery. There is   normal position of the common and internal and external iliacs bilaterally to   the femoral arteries. The lungs are clear bilaterally. There is no axillary, mediastinal or hilar   lymphadenopathy. Pleural spaces are normal. Heart is of normal size and there is   no pericardial effusion. The liver, spleen, adrenals, kidneys, pancreas and gallbladder are normal apart   from 5 mm interpolar collecting system stone of the left kidney without   hydronephrosis. There is no mesenteric or retroperitoneal lymphadenopathy. No   thickened or dilated loop of large or small bowel is visualized. The appendix is   normal. There is no free intraperitoneal gas or fluid. Urinary bladder is partially filled and grossly normal. There is no pelvic   lymphadenopathy. The prostate and seminal vesicles appear unremarkable.        The surrounding musculoskeletal structures and soft tissue structures appear   unremarkable apart from small fat-containing inguinal hernias and degenerative   spine change. 06/29/20 0226  CTA ABDOMEN PELV W CONT Final result    Impression:  IMPRESSION:        1. Normal appearance to the thoracic, abdominal, and pelvic vasculature with no   aneurysm or dissection. 2. Incidental findings as above including nonobstructing left nephrolithiasis. Narrative:  EXAM: CTA CHEST, ABDOMEN, AND PELVIS       CLINICAL HISTORY: hypotensive abd pain, eval aorta. COMPARISON: None. TECHNIQUE: CTA of the chest, abdomen, and pelvis performed with helical 2.5 mm   axial reconstructions. Sagittal and coronal 2.5 mm reformatted imaging was   performed. Thin section and thick section MIP sagittal and coronal   reconstructions and manual post-processing of the images with 3-D volume   rotating MIP projections of the vascular tree generated. Standard dose   modulation was utilized to reduce the overall radiation dose administered to the   patient. CONTRAST:  100 mL Isovue-370 IV. FINDINGS:        The aortic arch shows normal branch pattern with normally enhancing visualized   portions of the innominate, right subclavian, right common carotid, left common   carotid, left subclavian, and vertebral arteries. The descending thoracic aorta   opacifies normally with no dissection or aneurysm. The heart is normal in size without pericardial effusion. Pulmonary arteries   normal in caliber with no identified pulmonary embolus. Within the abdomen the aorta is normal in caliber without aneurysm or   dissection. There is normal opacification of the celiac axis, superior   mesenteric artery, renal arteries, and inferior mesenteric artery. There is   normal position of the common and internal and external iliacs bilaterally to   the femoral arteries. The lungs are clear bilaterally. There is no axillary, mediastinal or hilar   lymphadenopathy.  Pleural spaces are normal. Heart is of normal size and there is   no pericardial effusion. The liver, spleen, adrenals, kidneys, pancreas and gallbladder are normal apart   from 5 mm interpolar collecting system stone of the left kidney without   hydronephrosis. There is no mesenteric or retroperitoneal lymphadenopathy. No   thickened or dilated loop of large or small bowel is visualized. The appendix is   normal. There is no free intraperitoneal gas or fluid. Urinary bladder is partially filled and grossly normal. There is no pelvic   lymphadenopathy. The prostate and seminal vesicles appear unremarkable. The surrounding musculoskeletal structures and soft tissue structures appear   unremarkable apart from small fat-containing inguinal hernias and degenerative   spine change. CXR Results  (Last 48 hours)    None            Medical Decision Making   I am the first provider for this patient. I reviewed the vital signs, available nursing notes, past medical history, past surgical history, family history and social history. Vital Signs-Reviewed the patient's vital signs.   Patient Vitals for the past 12 hrs:   Temp Pulse Resp BP SpO2   06/29/20 0515  (!) 50 17 114/66 99 %   06/29/20 0500  (!) 55 16 116/62 96 %   06/29/20 0445  (!) 55 16 108/62 96 %   06/29/20 0430  (!) 56 16 102/60 96 %   06/29/20 0330  61 20 116/60 98 %   06/29/20 0258  72 22  98 %   06/29/20 0245  65 18 135/74 97 %   06/29/20 0242  64 21  99 %   06/29/20 0230  (!) 59 20 129/73 99 %   06/29/20 0141 97.5 °F (36.4 °C) (!) 34 28 (!) 80/62 98 %       EKG at 1:46 AM on June 29, 2020 interpreted by me: Junctional bradycardia, 36 bpm, left axis deviation, normal QRS and QTc intervals, nonspecific ST changes    EKG at 2:54 AM on June 29, 2020 interpreted by me: Normal sinus rhythm, 64 bpm, left axis deviation, normal SD, QRS, QTc intervals, no ischemic changes    Records Reviewed: Nursing Notes and Old Medical Records    Provider Notes (Medical Decision Making):   Differential diagnosis: Arrhythmia, electrolyte abnormality, dehydration, acute coronary syndrome, abdominal aneurysm, UTI  We will check CBC, CMP, lactate, lipase, CPK, troponin, CTA of chest abdomen and pelvis, UA    ED Course:   Initial assessment performed. The patients presenting problems have been discussed, and they are in agreement with the care plan formulated and outlined with them. I have encouraged them to ask questions as they arise throughout their visit. Progress Notes:  ED Course as of Jul 01 2234 Mon Jun 29, 2020   0230 Patient initially bradycardic and hypotensive. He appeared ill with pale skin and diaphoresis. Treated with 0.5 mg atropine and heart rate improved into the 50s. Blood pressure also improved to the low 100s. Patient was started on IV fluid bolus and sent to CT scan to rule out ruptured AAA as he was complaining of his abdomen feeling like it was going to explode.    [AO]   0330 Patient is feeling much better. CT scan did not show any acute abnormality. Patient vomited once in the CT scanner and felt much better after vomiting. On arrival back to his room, he was noted to be in a sinus rhythm with a heart rate in the 55-65 range.    [AO]   0517 Case discussed with Dr. Romana Gomez (hospitalist) who will see and admit the patient. Patient is feeling much better. His heart rate is between 55 and 60 and is sinus at this time. Blood pressures are improved.     [AO]      ED Course User Index  [AO] Justine De La Cruz MD       Disposition:  Admit to hospitalist    CRITICAL CARE NOTE :        IMPENDING DETERIORATION -Metabolic    ASSOCIATED RISK FACTORS - Hypotension, Dysrhythmia and Metabolic changes    MANAGEMENT- Bedside Assessment and Supervision of Care    INTERPRETATION -  CT Scan, ECG and Blood Pressure    INTERVENTIONS - hemodynamic mngmt and Metobolic interventions    CASE REVIEW - Hospitalist and Nursing    TREATMENT RESPONSE -Improved    PERFORMED BY - Self        NOTES   :      I have spent 45 minutes of critical care time involved in lab review, consultations with specialist, family decision- making, bedside attention and documentation. During this entire length of time I was immediately available to the patient . Edy Figueroa MD              Diagnosis     Clinical Impression:   1. Junctional bradycardia    2. Other specified hypotension    3. Lactic acidosis    4. Abdominal pain, generalized    5.  Nausea and vomiting, intractability of vomiting not specified, unspecified vomiting type

## 2020-06-29 NOTE — CONSULTS
Consult    NAME: Denita Pichardo   :  1955   MRN:  [id number]     Date/Time:  2020 11:02 AM    Patient PCP: Maisha Echavarria MD  ________________________________________________________________________     Assessment:   Severe abd pain with transient junctional bradycardia  COVID PUI    - Distant negative stress test  - Echo pending  - Sinus bradycardia, could not take bystolic in past, junctional bradycardia in setting of abdominal pain  - HTN  - Depression  - left renal stone  - +cigar use  -  works as a , was going to gym          Plan: Active without exertional symptoms  No hx of syncope  In setting of abdominal pain had junctional bradycardia, resolves with resolution of abd pain, likely vagally mediated. No baseline conduction disease, no symptoms. Check Echo  Check TSH  Keep on tele tonight    - Cont asa  - Cont Cozaar  - Gentle hydration  - Follow up on COVID test    Hopefully home tomorrow. [x]        High complexity decision making was performed        Subjective:   CHIEF COMPLAINT: Abd pain    HISTORY OF PRESENT ILLNESS:       Denita Pichardo is a 63 y.o. male with PMH fatty liver, HTN, hyperlipidemia not on statin, hx elevated LFTs presented with acute onset of severe 10/10 diffuse abdominal pain, associated with nausea, and then had 3 episodes of vomiting yellow fluid in ER. Eldred like his abdomen was going to explode. + sweats and dizziness. Last BM 3 days ago. Denied fever, chills, SOB. No chest pain. Had spent 5 hours cutting yard earlier yesterday and though he may be dehydrated and back was also achy.     In ER, was in junctional bradycardia HR 34, BP initially 80/62. Given atropine 0.5mg and 2L IVF. This AM in ER, no complaint, active was going to gym, doing yardwork, no exertional symptoms, no hx of presyncope, syncope. Did yardwork yesterday, possibly dehydrated, came to ER, felt better after vomiting.       We were asked to consult for work up and evaluation of the above problems. Past Medical History:   Diagnosis Date    Fatty liver     Hyperlipidemia     Hypertension     Other ill-defined conditions(799.89)     MRSA L posterior thigh - 2006      Past Surgical History:   Procedure Laterality Date    HX HERNIA REPAIR Left 2012    HX TONSILLECTOMY      NV COLONOSCOPY FLX DX W/COLLJ SPEC WHEN PFRMD  11/21/2012    Dr. Lucina Enriquez     Allergies   Allergen Reactions    Lisinopril Cough    Pravastatin Other (comments)     Right shoulder pain      Meds:  See below  Social History     Tobacco Use    Smoking status: Current Every Day Smoker     Types: Cigars    Smokeless tobacco: Never Used   Substance Use Topics    Alcohol use: Not Currently      Family History   Problem Relation Age of Onset    Diabetes Mother     Alzheimer Father     Other Brother         fatty liver       REVIEW OF SYSTEMS:     []         Unable to obtain  ROS due to ---   [x]         Total of 12 systems reviewed as follows:     Total of 12 systems reviewed as follows:       POSITIVE= Bold text  Negative = normal text  General:  fever, chills, sweats, generalized weakness, weight loss/gain,      loss of appetite   Eyes:    blurred vision, eye pain, loss of vision, double vision  ENT:    rhinorrhea, pharyngitis   Respiratory:   cough, sputum production, SOB, BASILIO, wheezing, pleuritic pain   Cardiology:   chest pain, palpitations, orthopnea, PND, edema, syncope   Gastrointestinal:  abdominal pain , N/V, diarrhea, dysphagia, constipation, bleeding   Genitourinary:  frequency, urgency, dysuria, hematuria, incontinence   Muskuloskeletal :  arthralgia, myalgia, back pain  Hematology:  easy bruising, nose or gum bleeding, lymphadenopathy   Dermatological: rash, ulceration, pruritis, color change / jaundice  Endocrine:   hot flashes or polydipsia   Neurological:  headache, dizziness, confusion, focal weakness, paresthesia,     Speech difficulties, memory loss, gait difficulty  Psychological: Feelings of anxiety, depression, agitation    Objective:      Physical Exam:    Last 24hrs VS reviewed since prior progress note. Most recent are:    Visit Vitals  /65   Pulse (!) 58   Temp 97.5 °F (36.4 °C)   Resp 19   Ht 5' 11\" (1.803 m)   Wt 93 kg (205 lb)   SpO2 98%   BMI 28.59 kg/m²       Intake/Output Summary (Last 24 hours) at 6/29/2020 1102  Last data filed at 6/29/2020 0715  Gross per 24 hour   Intake 1000 ml   Output    Net 1000 ml        General Appearance: Well developed, well nourished, alert & oriented x 3,    no acute distress. LIMITED EXAM due to COVID PUI  Ears/Nose/Mouth/Throat: Pupils equal and round, Hearing grossly normal.  Neck: Supple. JVP within normal limits. Carotids good upstrokes, with no bruit. Chest: Lungs clear to auscultation bilaterally. Cardiovascular: Regular rate and rhythm, S1S2 normal, no murmur, rubs, gallops. Abdomen: Soft, non-tender, bowel sounds are active. No organomegaly. Extremities: No edema bilaterally. Femoral pulses +2, Distal Pulses +1. Skin: Warm and dry. Neuro: CN II-XII grossly intact, Strength and sensation grossly intact. Data:      Prior to Admission medications    Medication Sig Start Date End Date Taking? Authorizing Provider   escitalopram oxalate (LEXAPRO) 10 mg tablet Take 10 mg by mouth daily. Take 1/2 tablet by mouth every day 3/26/20  Yes Other, MD Marce   losartan (COZAAR) 50 mg tablet Take 50 mg by mouth daily. Take 1 tablet by mouth every day 3/26/20  Yes Other, MD Marce   glucosamine (Glucosamine Relief) 1,000 mg tab Take 1 Tab by mouth daily. Yes Provider, Historical   aspirin delayed-release 81 mg tablet Take 81 mg by mouth daily.      Yes Provider, Historical       Recent Results (from the past 24 hour(s))   EKG, 12 LEAD, INITIAL    Collection Time: 06/29/20  1:46 AM   Result Value Ref Range    Ventricular Rate 36 BPM    Atrial Rate 43 BPM    QRS Duration 106 ms    Q-T Interval 458 ms QTC Calculation (Bezet) 354 ms    Calculated R Axis -34 degrees    Calculated T Axis -15 degrees    Diagnosis       Junctional bradycardia  Left axis deviation  Incomplete left bundle branch block  Minimal voltage criteria for LVH, may be normal variant  No previous ECGs available     GLUCOSE, POC    Collection Time: 06/29/20  1:59 AM   Result Value Ref Range    Glucose (POC) 129 (H) 65 - 100 mg/dL    Performed by Florin ZELAYA    METABOLIC PANEL, COMPREHENSIVE    Collection Time: 06/29/20  2:01 AM   Result Value Ref Range    Sodium 137 136 - 145 mmol/L    Potassium 3.7 3.5 - 5.1 mmol/L    Chloride 106 97 - 108 mmol/L    CO2 21 21 - 32 mmol/L    Anion gap 10 5 - 15 mmol/L    Glucose 126 (H) 65 - 100 mg/dL    BUN 19 6 - 20 MG/DL    Creatinine 1.31 (H) 0.70 - 1.30 MG/DL    BUN/Creatinine ratio 15 12 - 20      GFR est AA >60 >60 ml/min/1.73m2    GFR est non-AA 55 (L) >60 ml/min/1.73m2    Calcium 8.9 8.5 - 10.1 MG/DL    Bilirubin, total 0.5 0.2 - 1.0 MG/DL    ALT (SGPT) 103 (H) 12 - 78 U/L    AST (SGOT) 88 (H) 15 - 37 U/L    Alk. phosphatase 101 45 - 117 U/L    Protein, total 7.3 6.4 - 8.2 g/dL    Albumin 3.7 3.5 - 5.0 g/dL    Globulin 3.6 2.0 - 4.0 g/dL    A-G Ratio 1.0 (L) 1.1 - 2.2     CK W/ REFLX CKMB    Collection Time: 06/29/20  2:01 AM   Result Value Ref Range     39 - 308 U/L   TROPONIN I    Collection Time: 06/29/20  2:01 AM   Result Value Ref Range    Troponin-I, Qt. <0.05 <0.05 ng/mL   SAMPLES BEING HELD    Collection Time: 06/29/20  2:02 AM   Result Value Ref Range    SAMPLES BEING HELD BL     COMMENT        Add-on orders for these samples will be processed based on acceptable specimen integrity and analyte stability, which may vary by analyte.    POC CHEM8    Collection Time: 06/29/20  2:02 AM   Result Value Ref Range    Calcium, ionized (POC) 1.16 1.12 - 1.32 mmol/L    Sodium (POC) 139 136 - 145 mmol/L    Potassium (POC) 3.5 3.5 - 5.1 mmol/L    Chloride (POC) 107 98 - 107 mmol/L    CO2 (POC) 23 21 - 32 mmol/L    Anion gap (POC) 15 10 - 20 mmol/L    Glucose (POC) 133 (H) 65 - 100 mg/dL    BUN (POC) 19 9 - 20 mg/dL    Creatinine (POC) 1.0 0.6 - 1.3 mg/dL    GFRAA, POC >60 >60 ml/min/1.73m2    GFRNA, POC >60 >60 ml/min/1.73m2    Hematocrit (POC) 43 36.6 - 50.3 %    Comment Comment Not Indicated. POC TROPONIN-I    Collection Time: 06/29/20  2:03 AM   Result Value Ref Range    Troponin-I (POC) <0.04 0.00 - 0.08 ng/mL   POC LACTIC ACID    Collection Time: 06/29/20  2:07 AM   Result Value Ref Range    Lactic Acid (POC) 3.20 (HH) 0.40 - 2.00 mmol/L   EKG, 12 LEAD, SUBSEQUENT    Collection Time: 06/29/20  2:54 AM   Result Value Ref Range    Ventricular Rate 64 BPM    Atrial Rate 64 BPM    P-R Interval 146 ms    QRS Duration 98 ms    Q-T Interval 420 ms    QTC Calculation (Bezet) 433 ms    Calculated P Axis 58 degrees    Calculated R Axis -31 degrees    Calculated T Axis -32 degrees    Diagnosis       Normal sinus rhythm  Left axis deviation  Minimal voltage criteria for LVH, may be normal variant  When compared with ECG of 29-JUN-2020 01:46,  MANUAL COMPARISON REQUIRED, DATA IS UNCONFIRMED     URINALYSIS W/ REFLEX CULTURE    Collection Time: 06/29/20  3:21 AM   Result Value Ref Range    Color YELLOW/STRAW      Appearance CLEAR CLEAR      Specific gravity 1.015 1.003 - 1.030      pH (UA) 6.0 5.0 - 8.0      Protein Negative NEG mg/dL    Glucose Negative NEG mg/dL    Ketone Negative NEG mg/dL    Bilirubin Negative NEG      Blood Negative NEG      Urobilinogen 1.0 0.2 - 1.0 EU/dL    Nitrites Negative NEG      Leukocyte Esterase Negative NEG      UA:UC IF INDICATED CULTURE NOT INDICATED BY UA RESULT CNI      WBC 0-4 0 - 4 /hpf    RBC 0-5 0 - 5 /hpf    Epithelial cells FEW FEW /lpf    Bacteria Negative NEG /hpf    Hyaline cast 0-2 0 - 5 /lpf   CBC WITH AUTOMATED DIFF    Collection Time: 06/29/20  3:52 AM   Result Value Ref Range    WBC 10.0 4.1 - 11.1 K/uL    RBC 4.89 4. 10 - 5.70 M/uL    HGB 14.6 12.1 - 17.0 g/dL    HCT 43.0 36.6 - 50.3 %    MCV 87.9 80.0 - 99.0 FL    MCH 29.9 26.0 - 34.0 PG    MCHC 34.0 30.0 - 36.5 g/dL    RDW 13.7 11.5 - 14.5 %    PLATELET 637 306 - 575 K/uL    MPV 9.5 8.9 - 12.9 FL    NRBC 0.0 0  WBC    ABSOLUTE NRBC 0.00 0.00 - 0.01 K/uL    NEUTROPHILS 82 (H) 32 - 75 %    LYMPHOCYTES 7 (L) 12 - 49 %    MONOCYTES 9 5 - 13 %    EOSINOPHILS 1 0 - 7 %    BASOPHILS 0 0 - 1 %    IMMATURE GRANULOCYTES 1 (H) 0.0 - 0.5 %    ABS. NEUTROPHILS 8.2 (H) 1.8 - 8.0 K/UL    ABS. LYMPHOCYTES 0.7 (L) 0.8 - 3.5 K/UL    ABS. MONOCYTES 0.9 0.0 - 1.0 K/UL    ABS. EOSINOPHILS 0.1 0.0 - 0.4 K/UL    ABS. BASOPHILS 0.0 0.0 - 0.1 K/UL    ABS. IMM.  GRANS. 0.1 (H) 0.00 - 0.04 K/UL    DF SMEAR SCANNED      PLATELET COMMENTS Large Platelets      RBC COMMENTS NORMOCYTIC, NORMOCHROMIC     POC LACTIC ACID    Collection Time: 06/29/20  5:12 AM   Result Value Ref Range    Lactic Acid (POC) 2.40 (HH) 0.40 - 2.00 mmol/L   TROPONIN I    Collection Time: 06/29/20  5:38 AM   Result Value Ref Range    Troponin-I, Qt. <0.05 <0.05 ng/mL

## 2020-06-29 NOTE — ED NOTES
Pt vomited just after CT scan was completed; reports feeling \"much better\" afterwards. HR increased as did BP & pt is no longer tachypneic.

## 2020-06-29 NOTE — ED NOTES
Dr Bolivar Christianson at bedside to discuss lab/imaging results & to inform him he will be admitted to the hospital.

## 2020-06-30 ENCOUNTER — APPOINTMENT (OUTPATIENT)
Dept: NON INVASIVE DIAGNOSTICS | Age: 65
End: 2020-06-30
Attending: HOSPITALIST
Payer: COMMERCIAL

## 2020-06-30 VITALS
OXYGEN SATURATION: 96 % | WEIGHT: 205.03 LBS | RESPIRATION RATE: 18 BRPM | HEART RATE: 53 BPM | DIASTOLIC BLOOD PRESSURE: 72 MMHG | BODY MASS INDEX: 28.7 KG/M2 | TEMPERATURE: 97.6 F | SYSTOLIC BLOOD PRESSURE: 116 MMHG | HEIGHT: 71 IN

## 2020-06-30 LAB
ALBUMIN SERPL-MCNC: 3.4 G/DL (ref 3.5–5)
ALBUMIN/GLOB SERPL: 1.1 {RATIO} (ref 1.1–2.2)
ALP SERPL-CCNC: 99 U/L (ref 45–117)
ALT SERPL-CCNC: 96 U/L (ref 12–78)
ANION GAP SERPL CALC-SCNC: 8 MMOL/L (ref 5–15)
AST SERPL-CCNC: 58 U/L (ref 15–37)
BILIRUB SERPL-MCNC: 1.1 MG/DL (ref 0.2–1)
BUN SERPL-MCNC: 10 MG/DL (ref 6–20)
BUN/CREAT SERPL: 13 (ref 12–20)
CALCIUM SERPL-MCNC: 8.7 MG/DL (ref 8.5–10.1)
CHLORIDE SERPL-SCNC: 110 MMOL/L (ref 97–108)
CO2 SERPL-SCNC: 20 MMOL/L (ref 21–32)
CREAT SERPL-MCNC: 0.75 MG/DL (ref 0.7–1.3)
ECHO AO ROOT DIAM: 3.47 CM
ECHO AV AREA PEAK VELOCITY: 2.29 CM2
ECHO AV AREA/BSA PEAK VELOCITY: 1.1 CM2/M2
ECHO AV CUSP MM: 2.06 CM
ECHO AV PEAK GRADIENT: 8.29 MMHG
ECHO AV PEAK VELOCITY: 144 CM/S
ECHO EST RA PRESSURE: 10 MMHG
ECHO LA AREA 4C: 28.38 CM2
ECHO LA MAJOR AXIS: 4.51 CM
ECHO LA MINOR AXIS: 2.12 CM
ECHO LA TO AORTIC ROOT RATIO: 1.23
ECHO LA TO AORTIC ROOT RATIO: 1.23
ECHO LA VOL 2C: 56.58 ML (ref 18–58)
ECHO LA VOL 4C: 100.72 ML (ref 18–58)
ECHO LA VOLUME INDEX A2C: 26.55 ML/M2 (ref 16–28)
ECHO LA VOLUME INDEX A4C: 47.26 ML/M2 (ref 16–28)
ECHO LV E' LATERAL VELOCITY: 10.04 CM/S
ECHO LV E' SEPTAL VELOCITY: 7.9 CM/S
ECHO LV INTERNAL DIMENSION DIASTOLIC: 5.01 CM (ref 4.2–5.9)
ECHO LV INTERNAL DIMENSION SYSTOLIC: 3.22 CM
ECHO LV IVSD: 1.37 CM (ref 0.6–1)
ECHO LV IVSS: 1.95 CM
ECHO LV MASS 2D: 269.9 G (ref 88–224)
ECHO LV MASS INDEX 2D: 126.6 G/M2 (ref 49–115)
ECHO LV POSTERIOR WALL DIASTOLIC: 1.28 CM (ref 0.6–1)
ECHO LV POSTERIOR WALL SYSTOLIC: 2.03 CM
ECHO LVOT DIAM: 2.06 CM
ECHO LVOT PEAK GRADIENT: 3.96 MMHG
ECHO LVOT PEAK VELOCITY: 99.46 CM/S
ECHO MV A VELOCITY: 74.3 CM/S
ECHO MV E DECELERATION TIME (DT): 0.15 S
ECHO MV E VELOCITY: 65.46 CM/S
ECHO MV E/A RATIO: 0.88
ECHO MV E/E' LATERAL: 6.52
ECHO MV E/E' RATIO (AVERAGED): 7.4
ECHO MV E/E' SEPTAL: 8.29
ECHO PV PEAK INSTANTANEOUS GRADIENT SYSTOLIC: 3.76 MMHG
ECHO RA AREA 4C: 18.7 CM2
ECHO RIGHT VENTRICULAR SYSTOLIC PRESSURE (RVSP): 20.16 MMHG
ECHO RV INTERNAL DIMENSION: 3.98 CM
ECHO TV REGURGITANT MAX VELOCITY: 159.41 CM/S
ECHO TV REGURGITANT MAX VELOCITY: 96.99 CM/S
ECHO TV REGURGITANT PEAK GRADIENT: 10.16 MMHG
ERYTHROCYTE [DISTWIDTH] IN BLOOD BY AUTOMATED COUNT: 14 % (ref 11.5–14.5)
GLOBULIN SER CALC-MCNC: 3.2 G/DL (ref 2–4)
GLUCOSE SERPL-MCNC: 108 MG/DL (ref 65–100)
HCT VFR BLD AUTO: 41.6 % (ref 36.6–50.3)
HGB BLD-MCNC: 14.2 G/DL (ref 12.1–17)
MCH RBC QN AUTO: 30.1 PG (ref 26–34)
MCHC RBC AUTO-ENTMCNC: 34.1 G/DL (ref 30–36.5)
MCV RBC AUTO: 88.1 FL (ref 80–99)
NRBC # BLD: 0 K/UL (ref 0–0.01)
NRBC BLD-RTO: 0 PER 100 WBC
PLATELET # BLD AUTO: 182 K/UL (ref 150–400)
PMV BLD AUTO: 9.7 FL (ref 8.9–12.9)
POTASSIUM SERPL-SCNC: 4.1 MMOL/L (ref 3.5–5.1)
PROT SERPL-MCNC: 6.6 G/DL (ref 6.4–8.2)
RBC # BLD AUTO: 4.72 M/UL (ref 4.1–5.7)
SODIUM SERPL-SCNC: 138 MMOL/L (ref 136–145)
WBC # BLD AUTO: 8.2 K/UL (ref 4.1–11.1)

## 2020-06-30 PROCEDURE — 99218 HC RM OBSERVATION: CPT

## 2020-06-30 PROCEDURE — 74011250637 HC RX REV CODE- 250/637: Performed by: INTERNAL MEDICINE

## 2020-06-30 PROCEDURE — 74011250636 HC RX REV CODE- 250/636: Performed by: HOSPITALIST

## 2020-06-30 PROCEDURE — 85027 COMPLETE CBC AUTOMATED: CPT

## 2020-06-30 PROCEDURE — 93306 TTE W/DOPPLER COMPLETE: CPT

## 2020-06-30 PROCEDURE — 96372 THER/PROPH/DIAG INJ SC/IM: CPT

## 2020-06-30 PROCEDURE — 36415 COLL VENOUS BLD VENIPUNCTURE: CPT

## 2020-06-30 PROCEDURE — 80053 COMPREHEN METABOLIC PANEL: CPT

## 2020-06-30 RX ORDER — LOSARTAN POTASSIUM 50 MG/1
25 TABLET ORAL DAILY
Status: DISCONTINUED | OUTPATIENT
Start: 2020-06-30 | End: 2020-06-30 | Stop reason: HOSPADM

## 2020-06-30 RX ADMIN — Medication 10 ML: at 05:39

## 2020-06-30 RX ADMIN — ENOXAPARIN SODIUM 40 MG: 40 INJECTION SUBCUTANEOUS at 13:19

## 2020-06-30 RX ADMIN — Medication 10 ML: at 13:19

## 2020-06-30 RX ADMIN — LOSARTAN POTASSIUM 25 MG: 50 TABLET ORAL at 09:01

## 2020-06-30 NOTE — DISCHARGE SUMMARY
Hospitalist Discharge Summary     Patient ID:  Gurdeep Park  [id number]  44 y.o.  1955 6/29/2020    PCP on record: Stephanie Caballero MD    Admit date: 6/29/2020  Discharge date and time: 6/30/2020    DISCHARGE DIAGNOSIS:    See below    CONSULTATIONS:  IP CONSULT TO CARDIOLOGY    Excerpted HPI from H&P of Marylu Collier MD:  Gurdeep Park is a 63 y.o. male with PMH fatty liver, HTN, hyperlipidemia not on statin, hx elevated LFTs presented with acute onset of severe 10/10 diffuse abdominal pain, associated with nausea, and then had 3 episodes of vomiting yellow fluid in ER. [place] like his abdomen was going to explode. + sweats and dizziness. Last BM 3 days ago. Denied fever, chills, SOB. No chest pain. Had spent 5 hours cutting yard earlier yesterday and though he may be dehydrated and back was also achy.     In ER, was in junctional bradycardia HR 34, BP initially 80/62. Given atropine 0.5mg and 2L IVF.     ______________________________________________________________________  DISCHARGE SUMMARY/HOSPITAL COURSE:  for full details see H&P, daily progress notes, labs, consult notes. Junctional bradycardia HR 34-36 with hypotension in setting of severe abdominal pain, POA    Lactic acidosis 3.2-->2.4 with 2L IVF  --bradycardia improved with atropine and hypotension resolved with atropine and IVF  --per patient, baseline HR in 50s; stopped taking bystolic due to severe bradycardia HR in 40s  --admitted under observation status  --CTA chest/abdomen without aneurysm or mesenteric ischemia/stenosis  --cardiology consult appreciated   --bradycardia felt likely 2/2 vagal episode  --check echo, tsh, repeat cardiac enzymes and lactic. Check lipase  --hold losartan as BP in low 100-110s currently  --check for covid-19 infection with rapid SARS-COV-2 test  --IVF NS 150ml/hr  --no signs of infection     Fatty liver  Chronic elevated LFTs per patient  --monitor LFTs.   No further abdominal pain so hold off abd US at this time  --check INR     Mild DIEGO, resolved  --IVF and recheck, resume losartan  ______________________________________________________________________  Patient seen and examined by me on discharge day. Pertinent Findings:  Gen:    Not in distress  Chest: Clear lungs  CVS:   Regular rhythm. No edema  Abd:  Soft, not distended, not tender  Neuro:  Alert, oriented x 3  _______________________________________________________________________  DISCHARGE MEDICATIONS:   Current Discharge Medication List      CONTINUE these medications which have NOT CHANGED    Details   escitalopram oxalate (LEXAPRO) 10 mg tablet Take 10 mg by mouth daily. Take 1/2 tablet by mouth every day      losartan (COZAAR) 50 mg tablet Take 50 mg by mouth daily. Take 1 tablet by mouth every day      glucosamine (Glucosamine Relief) 1,000 mg tab Take 1 Tab by mouth daily. aspirin delayed-release 81 mg tablet Take 81 mg by mouth daily. Patient Follow Up Instructions:    Activity: Activity as tolerated  Diet: Resume previous diet  Wound Care: None needed    Follow-up as below       Follow-up Information     Follow up With Specialties Details Why Contact Ralph Parker, [address] [facility] [place]  [phone]          ________________________________________________________________    Risk of deterioration: Moderate    Condition at Discharge:  Stable  __________________________________________________________________    Disposition  Home with family, no needs    ____________________________________________________________________    Code Status: Full Code  ___________________________________________________________________      Total time in minutes spent coordinating this discharge (includes going over instructions, follow-up, prescriptions, and preparing report for sign off to her PCP) :  35 minutes    Signed:  Minoo Peña Ashia, DO

## 2020-06-30 NOTE — PROGRESS NOTES
Problem: Falls - Risk of  Goal: *Absence of Falls  Description: Document Franck Long Fall Risk and appropriate interventions in the flowsheet. Outcome: Progressing Towards Goal  Note: Fall Risk Interventions:            Medication Interventions: Bed/chair exit alarm, Evaluate medications/consider consulting pharmacy                   Problem: Patient Education: Go to Patient Education Activity  Goal: Patient/Family Education  Outcome: Progressing Towards Goal     Problem: Airway Clearance - Ineffective  Goal: Achieve or maintain patent airway  Outcome: Progressing Towards Goal     Problem: Gas Exchange - Impaired  Goal: Absence of hypoxia  Outcome: Progressing Towards Goal  Goal: Promote optimal lung function  Outcome: Progressing Towards Goal     Problem: Breathing Pattern - Ineffective  Goal: Ability to achieve and maintain a regular respiratory rate  Outcome: Progressing Towards Goal     Problem:  Body Temperature -  Risk of, Imbalanced  Goal: Ability to maintain a body temperature within defined limits  Outcome: Progressing Towards Goal  Goal: Will regain or maintain usual level of consciousness  Outcome: Progressing Towards Goal  Goal: Complications related to the disease process, condition or treatment will be avoided or minimized  Outcome: Progressing Towards Goal     Problem: Nutrition Deficits  Goal: Optimize nutrtional status  Outcome: Progressing Towards Goal     Problem: Risk for Fluid Volume Deficit  Goal: Maintain normal heart rhythm  Outcome: Progressing Towards Goal  Goal: Maintain absence of muscle cramping  Outcome: Progressing Towards Goal  Goal: Maintain normal serum potassium, sodium, calcium, phosphorus, and pH  Outcome: Progressing Towards Goal     Problem: General Medical Care Plan  Goal: *Vital signs within specified parameters  Outcome: Progressing Towards Goal  Goal: *Labs within defined limits  Outcome: Progressing Towards Goal  Goal: *Absence of infection signs and symptoms  Outcome: Progressing Towards Goal  Goal: *Optimal pain control at patient's stated goal  Outcome: Progressing Towards Goal  Goal: *Skin integrity maintained  Outcome: Progressing Towards Goal  Goal: *Fluid volume balance  Outcome: Progressing Towards Goal  Goal: *Optimize nutritional status  Outcome: Progressing Towards Goal  Goal: *Anxiety reduced or absent  Outcome: Progressing Towards Goal  Goal: *Progressive mobility and function (eg: ADL's)  Outcome: Progressing Towards Goal

## 2020-06-30 NOTE — PROGRESS NOTES
Bedside and Verbal shift change report given to [address] (oncoming nurse) by Desean Marion (offgoing nurse). Report included the following information SBAR, Kardex, Intake/Output, MAR, Recent Results and Med Rec Status. 0745: Patient stable in bed. Heart rhythm and rate sinus matilde -asymptomatic. Patient complained of diarrhea over night, states last BM was around 0600. No complaints of pain. 1400: DISCHARGE  I have reviewed discharge instructions with the patient. The patient verbalized understanding. IVs removed. Patient walked out to entrance.

## 2020-06-30 NOTE — PROGRESS NOTES
Progress Note      6/30/2020 6:46 AM  NAME: Beryle Candy   MRN:  [id number]   Admit Diagnosis: Bradycardia [R00.1]  Hypotension [I95.9]  Bradycardia [R00.1]  DIEGO (acute kidney injury) ([facility] [place] [address] [N17.9]  Lactic acidosis [E87.2]  Hypotension [I95.9]                    Assessment:    Severe abd pain with transient junctional bradycardia  COVID PUI     - Distant negative stress test  - Echo 6/2020 nl EF, no sig valve disease.  - Sinus bradycardia, could not take bystolic in past, junctional bradycardia in setting of abdominal pain  - HTN  - Depression  - left renal stone  - +cigar use  -  works as a , was going to gym                        Plan:        Active without exertional symptoms  No hx of syncope  In setting of abdominal pain had junctional bradycardia, resolves with resolution of abd pain, likely vagally mediated. No baseline conduction disease, no symptoms.     Check Echo, Addendum:  Echo with normal EF, no sig valve disease. TSH ok  Tele unrevealing     - Cont asa  - Decrease Cozaar from 50mg to 25mg lowish bp  - Stop hydration  - covid negative     Hopefully home today, ?loose stool          [x]? High complexity decision making was performed           We discussed the expected course, resolution and complications of the diagnoses in detail. Medication risk, benefits, costs, interactions, and alternatives were discussed as indicated. I advised him to contact the office if his condition worsens, changes or fails to improve as anticipated. Patient expressed understanding with the diagnoses  and plan. Subjective:     Beryle Candy denies chest pain, dyspnea. + diarrhea  Discussed with RN events overnight.      Review of Systems:    Symptom Y/N Comments  Symptom Y/N Comments   Fever/Chills N   Chest Pain N    Poor Appetite N   Edema N    Cough N   Abdominal Pain N    Sputum N   Joint Pain N    SOB/BASILIO N   Pruritis/Rash N    Nausea/vomit N   Tolerating PT/OT Y Diarrhea y   Tolerating Diet Y    Constipation N   Other       Could NOT obtain due to:      Objective:      Physical Exam:    Last 24hrs VS reviewed since prior progress note. Most recent are:    Visit Vitals  /71   Pulse (!) 57   Temp 98 °F (36.7 °C)   Resp 15   Ht 5' 11\" (1.803 m)   Wt 93 kg (205 lb)   SpO2 98%   BMI 28.59 kg/m²       Intake/Output Summary (Last 24 hours) at 6/30/2020 0646  Last data filed at 6/29/2020 0715  Gross per 24 hour   Intake 1000 ml   Output    Net 1000 ml        General Appearance: Well developed, well nourished, alert & oriented x 3,    no acute distress. Ears/Nose/Mouth/Throat: Hearing grossly normal.  Neck: Supple. Chest: Lungs clear to auscultation bilaterally. Cardiovascular: Regular rate and rhythm, S1S2 normal, no murmur. Abdomen: Soft, non-tender, bowel sounds are active. Extremities: No edema bilaterally. Skin: Warm and dry. PMH/SH reviewed - no change compared to H&P    Data Review    Telemetry: normal sinus rhythm     Lab Data Personally Reviewed:    Recent Labs     06/30/20  0406 06/29/20  0352   WBC 8.2 10.0   HGB 14.2 14.6   HCT 41.6 43.0    173     Recent Labs     06/29/20  1122   INR 1.0   PTP 10.9      Recent Labs     06/30/20  0406 06/29/20  1122 06/29/20  0201    141 137   K 4.1 4.0 3.7   * 110* 106   CO2 20* 24 21   BUN 10 12 19   CREA 0.75 0.88 1.31*   * 91 126*   CA 8.7 8.4* 8.9     Recent Labs     06/29/20  1122 06/29/20  0538 06/29/20  0201     --   --    CKNDX 1.3  --   --    TROIQ <0.05 <0.05 <0.05     No results found for: CHOL, CHOLX, CHLST, CHOLV, HDL, HDLP, LDL, LDLC, DLDLP, Domenico Schillings, CHHD, CHHDX    Recent Labs     06/30/20  0406 06/29/20  1122 06/29/20  0201   AP 99 98 101   TP 6.6 6.9 7.3   ALB 3.4* 3.4* 3.7   GLOB 3.2 3.5 3.6   LPSE  --  96  --      No results for input(s): PH, PCO2, PO2 in the last 72 hours.     Medications Personally Reviewed:    Current Facility-Administered Medications Medication Dose Route Frequency    losartan (COZAAR) tablet 25 mg  25 mg Oral DAILY    sodium chloride (NS) flush 5-40 mL  5-40 mL IntraVENous Q8H    sodium chloride (NS) flush 5-40 mL  5-40 mL IntraVENous PRN    acetaminophen (TYLENOL) tablet 650 mg  650 mg Oral Q6H PRN    ondansetron (ZOFRAN) injection 4 mg  4 mg IntraVENous Q6H PRN    docusate sodium (COLACE) capsule 100 mg  100 mg Oral BID    enoxaparin (LOVENOX) injection 40 mg  40 mg SubCUTAneous Q24H         Loli Goodman MD

## 2020-06-30 NOTE — ROUTINE PROCESS
Attempted to schedule PCP [facility] with Dr. Stella Chow, due to COVID procedures at the practice the nurse will contact the patient with apt info

## 2020-07-01 ENCOUNTER — PATIENT OUTREACH (OUTPATIENT)
Dept: FAMILY MEDICINE CLINIC | Age: 65
End: 2020-07-01

## 2020-07-01 NOTE — PROGRESS NOTES
Patient contacted regarding recent discharge and COVID-19 risk. Discussed COVID-19 related testing which was available at this time. Test results were negative. Patient informed of results, if available? yes      Care Transition Nurse/ Ambulatory Care Manager contacted the patient by telephone to perform post discharge assessment. Verified name and  with patient as identifiers. Patient has following risk factors of: DIEGO,Bradycardia, Hypotension, Lactic Acidosis. CTN/ACM reviewed discharge instructions, medical action plan and red flags related to discharge diagnosis. Reviewed and educated them on any new and changed medications related to discharge diagnosis. Advised obtaining a 90-day supply of all daily and as-needed medications. Pt confirmed  No new meds @ d/c. Pt reported hasn't scheduled PCP f/u appt. Encouraged to call on 20 to scheduled. Education provided regarding infection prevention, and signs and symptoms of COVID-19 and when to seek medical attention with patient who verbalized understanding. Discussed exposure protocols and quarantine from [address] you at higher risk for severe illness  and given an opportunity for questions and concerns. The patient agrees to contact the COVID-19 hotline [phone] or PCP office for questions related to their healthcare. CTN/ACM provided contact information for future reference. From CDC: Are you at higher risk for severe illness?  Wash your hands often.  Avoid close contact (6 feet, which is about two arm lengths) with people who are sick.  Put distance between yourself and other people if COVID-19 is spreading in your community.  Clean and disinfect frequently touched surfaces.  Avoid all cruise travel and non-essential air travel.  Call your healthcare professional if you have concerns about COVID-19 and your underlying condition or if you are sick.     For more information on steps you can take to protect yourself, see CDC's How to Protect Yourself      Patient/family/caregiver given information for [facility] and agrees to enroll no  Patient's preferred e-mail:  n/a  Patient's preferred phone number: n/a  Based on Loop alert triggers, patient will be contacted by nurse care manager for worsening symptoms. Plan for follow-up call in 7-14 days based on severity of symptoms and risk factors.

## 2020-07-15 ENCOUNTER — PATIENT OUTREACH (OUTPATIENT)
Dept: CASE MANAGEMENT | Age: 65
End: 2020-07-15

## 2020-07-15 NOTE — PROGRESS NOTES
Patient resolved from Transition of Care episode on 7/15/20  Discussed COVID-19 related testing which was not done at this time. Test results were not done. Patient informed of results, if available? n/a     Patient/family has been provided the following resources and education related to COVID-19:                         Signs, symptoms and red flags related to COVID-19            CDC exposure and quarantine guidelines            [facility] exposure contact - [phone]            Contact for their local Department of Health                 Patient's wife currently reports that the following symptoms have improved:  no new symptoms and no worsening symptoms. No further outreach scheduled with this CTN/ACM/LPN/HC/ MA. Episode of Care resolved. Patient has this CTN/ACM/LPN/HC/MA contact information if future needs arise.

## 2022-03-19 PROBLEM — E87.20 LACTIC ACIDOSIS: Status: ACTIVE | Noted: 2020-06-29

## 2022-03-19 PROBLEM — R00.1 BRADYCARDIA: Status: ACTIVE | Noted: 2020-06-29

## 2022-03-19 PROBLEM — I95.9 HYPOTENSION: Status: ACTIVE | Noted: 2020-06-29

## 2022-03-20 PROBLEM — N17.9 AKI (ACUTE KIDNEY INJURY) (HCC): Status: ACTIVE | Noted: 2020-06-29

## 2022-06-20 ENCOUNTER — TRANSCRIBE ORDER (OUTPATIENT)
Dept: REGISTRATION | Age: 67
End: 2022-06-20

## 2022-06-20 ENCOUNTER — HOSPITAL ENCOUNTER (OUTPATIENT)
Dept: GENERAL RADIOLOGY | Age: 67
Discharge: HOME | End: 2022-06-20
Payer: COMMERCIAL

## 2022-06-20 DIAGNOSIS — M75.00 ADHESIVE CAPSULITIS OF SHOULDER: Primary | ICD-10-CM

## 2022-06-20 DIAGNOSIS — M75.00 ADHESIVE CAPSULITIS OF SHOULDER: ICD-10-CM

## 2022-06-20 PROCEDURE — 73030 X-RAY EXAM OF SHOULDER: CPT

## 2023-05-19 RX ORDER — LOSARTAN POTASSIUM 50 MG/1
50 TABLET ORAL DAILY
COMMUNITY
Start: 2020-03-26

## 2023-05-19 RX ORDER — ASPIRIN 81 MG/1
81 TABLET ORAL DAILY
COMMUNITY

## 2023-05-19 RX ORDER — ESCITALOPRAM OXALATE 10 MG/1
10 TABLET ORAL DAILY
COMMUNITY
Start: 2020-03-26